# Patient Record
Sex: FEMALE | Race: OTHER | Employment: STUDENT | URBAN - METROPOLITAN AREA
[De-identification: names, ages, dates, MRNs, and addresses within clinical notes are randomized per-mention and may not be internally consistent; named-entity substitution may affect disease eponyms.]

---
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
## 2024-07-31 ENCOUNTER — HOSPITAL ENCOUNTER (OUTPATIENT)
Age: 19
Discharge: HOME | End: 2024-07-31
Attending: EMERGENCY MEDICINE
Payer: COMMERCIAL

## 2024-07-31 VITALS
TEMPERATURE: 100 F | WEIGHT: 230 LBS | HEIGHT: 68 IN | BODY MASS INDEX: 34.86 KG/M2 | DIASTOLIC BLOOD PRESSURE: 56 MMHG | OXYGEN SATURATION: 99 % | RESPIRATION RATE: 16 BRPM | HEART RATE: 108 BPM | SYSTOLIC BLOOD PRESSURE: 138 MMHG

## 2024-07-31 DIAGNOSIS — J06.9 VIRAL UPPER RESPIRATORY ILLNESS: Primary | ICD-10-CM

## 2024-07-31 LAB
S PYO AG THROAT QL IA.RAPID: NEGATIVE
SARS-COV-2 RNA RESP QL NAA+PROBE: NOT DETECTED

## 2024-07-31 PROCEDURE — 99212 OFFICE O/P EST SF 10 MIN: CPT

## 2024-07-31 PROCEDURE — 99213 OFFICE O/P EST LOW 20 MIN: CPT

## 2024-07-31 PROCEDURE — 87651 STREP A DNA AMP PROBE: CPT | Performed by: EMERGENCY MEDICINE

## 2024-07-31 NOTE — ED PROVIDER NOTES
Patient Seen in: [facility]      History     Chief Complaint   Patient presents with    Cough/URI    Sore Throat     Stated Complaint: Cough/URI, Body Aches, Sore Throat    Subjective:   HPI    Patient presents with a 2-day history of some generalized body aches, nasal congestion, mild cough and primarily a sore throat.  She also describes some bilateral ear pain.  She is not short of breath.  She has no abdominal pain, nausea or vomiting.  She has no rash.  She has no headache.    Objective:   History reviewed. No pertinent past medical history.           History reviewed. No pertinent surgical history.             Social History     Socioeconomic History    Marital status: Single   Tobacco Use    Smoking status: Never    Smokeless tobacco: Never   Vaping Use    Vaping status: Never Used   Substance and Sexual Activity    Alcohol use: Never    Drug use: Never     Social Determinants of Health      Received from [facility]              Review of Systems    Positive for stated Chief Complaint: Cough/URI and Sore Throat    Other systems are as noted in HPI.  Constitutional and vital signs reviewed.      All other systems reviewed and negative except as noted above.    Physical Exam     ED Triage Vitals [07/31/24 1400]   /56   Pulse 108   Resp 16   Temp 99.8 °F (37.7 °C)   Temp src Oral   SpO2 99 %   O2 Device None (Room air)       Current Vitals:   Vital Signs  BP: 138/56  Pulse: 108  Resp: 16  Temp: 99.8 °F (37.7 °C)  Temp src: Oral    Oxygen Therapy  SpO2: 99 %  O2 Device: None (Room air)            Physical Exam  Vitals and nursing note reviewed.   Constitutional:       General: She is not in acute distress.     Appearance: Normal appearance. She is well-developed.   HENT:      Right Ear: Tympanic membrane and ear canal normal.      Left Ear: Tympanic membrane and ear canal normal.      Nose: No congestion or rhinorrhea.      Mouth/Throat:      Pharynx: No oropharyngeal  exudate or posterior oropharyngeal erythema.   Pulmonary:      Effort: Pulmonary effort is normal. No respiratory distress.      Breath sounds: Normal breath sounds. No wheezing.   Musculoskeletal:      Cervical back: Normal range of motion and neck supple.   Lymphadenopathy:      Cervical: No cervical adenopathy.   Neurological:      Mental Status: She is alert and oriented to person, place, and time.              ED Course     Labs Reviewed   RAPID STREP A - Normal   RAPID SARS-COV-2 BY PCR - Normal          ED Course as of 07/31/24 1437  ------------------------------------------------------------  Time: 07/31 1426  Value: POCT RAPID STREP: Negative  Comment: (Reviewed)  ------------------------------------------------------------  Time: 07/31 1431  Value: Rapid SARS-CoV-2 by PCR: Not Detected  Comment: (Reviewed)              MDM      Patient comes to immediate care with mild upper respiratory symptoms.  Clinical features do not suggest pneumonia or more serious respiratory illness.  Strep test and COVID testing were negative.  Her presentation is most consistent with a nonspecific viral upper respiratory infection.  The patient was discharged home with viral respiratory infection instructions and instructions use over-the-counter cough and cold medications.  She was instructed return for any acute change or worsening symptoms.                                   Medical Decision Making      Disposition and Plan     Clinical Impression:  1. Viral upper respiratory illness         Disposition:  Discharge  7/31/2024  2:32 pm    Follow-up:  [facility] [place] [id number] [phone]                Medications Prescribed:  There are no discharge medications for this patient.

## 2024-07-31 NOTE — ED INITIAL ASSESSMENT (HPI)
Swollen throat, B ear pain, headache, chills, body aches, Sore throat since yesterday    Tmax 100.2f today